# Patient Record
Sex: FEMALE | Race: WHITE | Employment: OTHER | URBAN - METROPOLITAN AREA
[De-identification: names, ages, dates, MRNs, and addresses within clinical notes are randomized per-mention and may not be internally consistent; named-entity substitution may affect disease eponyms.]

---

## 2017-07-27 ENCOUNTER — TRANSFERRED RECORDS (OUTPATIENT)
Dept: HEALTH INFORMATION MANAGEMENT | Facility: CLINIC | Age: 82
End: 2017-07-27

## 2019-01-01 ENCOUNTER — RECORDS - HEALTHEAST (OUTPATIENT)
Dept: LAB | Facility: CLINIC | Age: 84
End: 2019-01-01

## 2019-01-01 LAB
ALBUMIN UR-MCNC: ABNORMAL MG/DL
ALBUMIN UR-MCNC: ABNORMAL MG/DL
APPEARANCE UR: ABNORMAL
APPEARANCE UR: ABNORMAL
BACTERIA #/AREA URNS HPF: ABNORMAL HPF
BACTERIA #/AREA URNS HPF: ABNORMAL HPF
BACTERIA SPEC CULT: ABNORMAL
BACTERIA SPEC CULT: NO GROWTH
BILIRUB UR QL STRIP: NEGATIVE
BILIRUB UR QL STRIP: NEGATIVE
CAOX CRY #/AREA URNS HPF: PRESENT /[HPF]
CAOX CRY #/AREA URNS HPF: PRESENT /[HPF]
COLOR UR AUTO: ABNORMAL
COLOR UR AUTO: YELLOW
GLUCOSE UR STRIP-MCNC: NEGATIVE MG/DL
GLUCOSE UR STRIP-MCNC: NEGATIVE MG/DL
HGB UR QL STRIP: ABNORMAL
HGB UR QL STRIP: ABNORMAL
KETONES UR STRIP-MCNC: ABNORMAL MG/DL
KETONES UR STRIP-MCNC: ABNORMAL MG/DL
LEUKOCYTE ESTERASE UR QL STRIP: ABNORMAL
LEUKOCYTE ESTERASE UR QL STRIP: ABNORMAL
MUCOUS THREADS #/AREA URNS LPF: ABNORMAL LPF
MUCOUS THREADS #/AREA URNS LPF: ABNORMAL LPF
NITRATE UR QL: NEGATIVE
NITRATE UR QL: POSITIVE
PH UR STRIP: 5.5 [PH] (ref 4.5–8)
PH UR STRIP: 5.5 [PH] (ref 4.5–8)
RBC #/AREA URNS AUTO: ABNORMAL HPF
RBC #/AREA URNS AUTO: ABNORMAL HPF
RENAL EPI CELLS #/AREA URNS HPF: ABNORMAL LPF
SP GR UR STRIP: 1.02 (ref 1–1.03)
SP GR UR STRIP: 1.03 (ref 1–1.03)
SQUAMOUS #/AREA URNS AUTO: >100 LPF
SQUAMOUS #/AREA URNS AUTO: ABNORMAL LPF
UROBILINOGEN UR STRIP-ACNC: ABNORMAL
UROBILINOGEN UR STRIP-ACNC: ABNORMAL
WBC #/AREA URNS AUTO: >100 HPF
WBC #/AREA URNS AUTO: ABNORMAL HPF
WBC CLUMPS #/AREA URNS HPF: PRESENT /[HPF]
WBC CLUMPS #/AREA URNS HPF: PRESENT /[HPF]

## 2019-09-01 ENCOUNTER — APPOINTMENT (OUTPATIENT)
Dept: CT IMAGING | Facility: CLINIC | Age: 84
End: 2019-09-01
Attending: EMERGENCY MEDICINE
Payer: MEDICARE

## 2019-09-01 ENCOUNTER — APPOINTMENT (OUTPATIENT)
Dept: GENERAL RADIOLOGY | Facility: CLINIC | Age: 84
End: 2019-09-01
Attending: EMERGENCY MEDICINE
Payer: MEDICARE

## 2019-09-01 ENCOUNTER — HOSPITAL ENCOUNTER (OUTPATIENT)
Facility: CLINIC | Age: 84
Setting detail: OBSERVATION
Discharge: NURSING, REHAB OR LONG-TERM CARE | End: 2019-09-03
Attending: EMERGENCY MEDICINE | Admitting: HOSPITALIST
Payer: MEDICARE

## 2019-09-01 DIAGNOSIS — S09.93XA FACIAL INJURY, INITIAL ENCOUNTER: ICD-10-CM

## 2019-09-01 DIAGNOSIS — R29.6 FALLS FREQUENTLY: ICD-10-CM

## 2019-09-01 DIAGNOSIS — R09.02 HYPOXIA: ICD-10-CM

## 2019-09-01 DIAGNOSIS — R41.0 CONFUSION: ICD-10-CM
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
## 2019-09-01 LAB
ALBUMIN UR-MCNC: NEGATIVE MG/DL
ANION GAP SERPL CALCULATED.3IONS-SCNC: 3 MMOL/L (ref 3–14)
APPEARANCE UR: CLEAR
BASOPHILS # BLD AUTO: 0.1 10E9/L (ref 0–0.2)
BASOPHILS NFR BLD AUTO: 0.6 %
BILIRUB UR QL STRIP: NEGATIVE
BUN SERPL-MCNC: 16 MG/DL (ref 7–30)
CALCIUM SERPL-MCNC: 9.3 MG/DL (ref 8.5–10.1)
CHLORIDE SERPL-SCNC: 104 MMOL/L (ref 94–109)
CO2 SERPL-SCNC: 34 MMOL/L (ref 20–32)
COLOR UR AUTO: ABNORMAL
CREAT SERPL-MCNC: 0.81 MG/DL (ref 0.52–1.04)
D DIMER PPP FEU-MCNC: 0.9 UG/ML FEU (ref 0–0.5)
DIFFERENTIAL METHOD BLD: ABNORMAL
EOSINOPHIL # BLD AUTO: 0.3 10E9/L (ref 0–0.7)
EOSINOPHIL NFR BLD AUTO: 2.8 %
ERYTHROCYTE [DISTWIDTH] IN BLOOD BY AUTOMATED COUNT: 12.7 % (ref 10–15)
GFR SERPL CREATININE-BSD FRML MDRD: 66 ML/MIN/{1.73_M2}
GLUCOSE SERPL-MCNC: 79 MG/DL (ref 70–99)
GLUCOSE UR STRIP-MCNC: NEGATIVE MG/DL
HCT VFR BLD AUTO: 48.4 % (ref 35–47)
HGB BLD-MCNC: 15.5 G/DL (ref 11.7–15.7)
HGB UR QL STRIP: ABNORMAL
IMM GRANULOCYTES # BLD: 0 10E9/L (ref 0–0.4)
IMM GRANULOCYTES NFR BLD: 0.3 %
KETONES UR STRIP-MCNC: NEGATIVE MG/DL
LEUKOCYTE ESTERASE UR QL STRIP: NEGATIVE
LYMPHOCYTES # BLD AUTO: 1.6 10E9/L (ref 0.8–5.3)
LYMPHOCYTES NFR BLD AUTO: 17.7 %
MCH RBC QN AUTO: 32.6 PG (ref 26.5–33)
MCHC RBC AUTO-ENTMCNC: 32 G/DL (ref 31.5–36.5)
MCV RBC AUTO: 102 FL (ref 78–100)
MONOCYTES # BLD AUTO: 0.7 10E9/L (ref 0–1.3)
MONOCYTES NFR BLD AUTO: 7.5 %
MUCOUS THREADS #/AREA URNS LPF: PRESENT /LPF
NEUTROPHILS # BLD AUTO: 6.4 10E9/L (ref 1.6–8.3)
NEUTROPHILS NFR BLD AUTO: 71.1 %
NITRATE UR QL: NEGATIVE
NRBC # BLD AUTO: 0 10*3/UL
NRBC BLD AUTO-RTO: 0 /100
PH UR STRIP: 6.5 PH (ref 5–7)
PLATELET # BLD AUTO: 262 10E9/L (ref 150–450)
POTASSIUM SERPL-SCNC: 4.4 MMOL/L (ref 3.4–5.3)
RBC # BLD AUTO: 4.75 10E12/L (ref 3.8–5.2)
RBC #/AREA URNS AUTO: 9 /HPF (ref 0–2)
SODIUM SERPL-SCNC: 141 MMOL/L (ref 133–144)
SOURCE: ABNORMAL
SP GR UR STRIP: 1.01 (ref 1–1.03)
SQUAMOUS #/AREA URNS AUTO: 1 /HPF (ref 0–1)
TROPONIN I SERPL-MCNC: <0.015 UG/L (ref 0–0.04)
UROBILINOGEN UR STRIP-MCNC: 2 MG/DL (ref 0–2)
WBC # BLD AUTO: 9 10E9/L (ref 4–11)
WBC #/AREA URNS AUTO: 2 /HPF (ref 0–5)

## 2019-09-01 PROCEDURE — 93005 ELECTROCARDIOGRAM TRACING: CPT

## 2019-09-01 PROCEDURE — 85379 FIBRIN DEGRADATION QUANT: CPT | Performed by: EMERGENCY MEDICINE

## 2019-09-01 PROCEDURE — 70486 CT MAXILLOFACIAL W/O DYE: CPT

## 2019-09-01 PROCEDURE — 84484 ASSAY OF TROPONIN QUANT: CPT | Performed by: EMERGENCY MEDICINE

## 2019-09-01 PROCEDURE — 87086 URINE CULTURE/COLONY COUNT: CPT | Performed by: EMERGENCY MEDICINE

## 2019-09-01 PROCEDURE — 99220 ZZC INITIAL OBSERVATION CARE,LEVL III: CPT | Performed by: HOSPITALIST

## 2019-09-01 PROCEDURE — 85025 COMPLETE CBC W/AUTO DIFF WBC: CPT | Performed by: EMERGENCY MEDICINE

## 2019-09-01 PROCEDURE — 25000128 H RX IP 250 OP 636: Performed by: HOSPITALIST

## 2019-09-01 PROCEDURE — 99285 EMERGENCY DEPT VISIT HI MDM: CPT | Mod: 25

## 2019-09-01 PROCEDURE — 80048 BASIC METABOLIC PNL TOTAL CA: CPT | Performed by: EMERGENCY MEDICINE

## 2019-09-01 PROCEDURE — 81001 URINALYSIS AUTO W/SCOPE: CPT | Performed by: EMERGENCY MEDICINE

## 2019-09-01 PROCEDURE — 25000132 ZZH RX MED GY IP 250 OP 250 PS 637: Mod: GY | Performed by: EMERGENCY MEDICINE

## 2019-09-01 PROCEDURE — 72125 CT NECK SPINE W/O DYE: CPT

## 2019-09-01 PROCEDURE — G0378 HOSPITAL OBSERVATION PER HR: HCPCS

## 2019-09-01 PROCEDURE — 25800030 ZZH RX IP 258 OP 636: Performed by: HOSPITALIST

## 2019-09-01 PROCEDURE — 25000132 ZZH RX MED GY IP 250 OP 250 PS 637: Mod: GY | Performed by: PHYSICIAN ASSISTANT

## 2019-09-01 PROCEDURE — 93005 ELECTROCARDIOGRAM TRACING: CPT | Mod: 76

## 2019-09-01 PROCEDURE — 70450 CT HEAD/BRAIN W/O DYE: CPT

## 2019-09-01 PROCEDURE — 71046 X-RAY EXAM CHEST 2 VIEWS: CPT

## 2019-09-01 RX ORDER — DOCUSATE SODIUM 100 MG/1
100 CAPSULE, LIQUID FILLED ORAL DAILY
COMMUNITY

## 2019-09-01 RX ORDER — BUSPIRONE HYDROCHLORIDE 5 MG/1
5 TABLET ORAL 2 TIMES DAILY
COMMUNITY

## 2019-09-01 RX ORDER — ACETAMINOPHEN 325 MG/1
975 TABLET ORAL ONCE
Status: COMPLETED | OUTPATIENT
Start: 2019-09-01 | End: 2019-09-01

## 2019-09-01 RX ORDER — RIVASTIGMINE TARTRATE 1.5 MG/1
3 CAPSULE ORAL 2 TIMES DAILY
Status: DISCONTINUED | OUTPATIENT
Start: 2019-09-01 | End: 2019-09-03 | Stop reason: HOSPADM

## 2019-09-01 RX ORDER — ONDANSETRON 4 MG/1
4 TABLET, ORALLY DISINTEGRATING ORAL EVERY 6 HOURS PRN
Status: DISCONTINUED | OUTPATIENT
Start: 2019-09-01 | End: 2019-09-03 | Stop reason: HOSPADM

## 2019-09-01 RX ORDER — KETOCONAZOLE 20 MG/ML
SHAMPOO TOPICAL
COMMUNITY

## 2019-09-01 RX ORDER — CARBIDOPA AND LEVODOPA 25; 100 MG/1; MG/1
2 TABLET, EXTENDED RELEASE ORAL AT BEDTIME
COMMUNITY

## 2019-09-01 RX ORDER — CARBIDOPA AND LEVODOPA 25; 100 MG/1; MG/1
1.5 TABLET ORAL
COMMUNITY

## 2019-09-01 RX ORDER — ACETAMINOPHEN 650 MG/1
650 SUPPOSITORY RECTAL EVERY 4 HOURS PRN
Status: DISCONTINUED | OUTPATIENT
Start: 2019-09-01 | End: 2019-09-03 | Stop reason: HOSPADM

## 2019-09-01 RX ORDER — ASPIRIN 81 MG/1
81 TABLET ORAL DAILY
COMMUNITY

## 2019-09-01 RX ORDER — QUETIAPINE FUMARATE 25 MG/1
12.5 TABLET, FILM COATED ORAL AT BEDTIME
COMMUNITY

## 2019-09-01 RX ORDER — SODIUM CHLORIDE 9 MG/ML
INJECTION, SOLUTION INTRAVENOUS CONTINUOUS
Status: DISCONTINUED | OUTPATIENT
Start: 2019-09-01 | End: 2019-09-02

## 2019-09-01 RX ORDER — CARBIDOPA AND LEVODOPA 25; 100 MG/1; MG/1
2 TABLET, EXTENDED RELEASE ORAL AT BEDTIME
Status: DISCONTINUED | OUTPATIENT
Start: 2019-09-01 | End: 2019-09-03 | Stop reason: HOSPADM

## 2019-09-01 RX ORDER — BUPROPION HYDROCHLORIDE 300 MG/1
300 TABLET ORAL EVERY MORNING
COMMUNITY

## 2019-09-01 RX ORDER — FLUOCINOLONE ACETONIDE 0.11 MG/ML
OIL AURICULAR (OTIC) DAILY PRN
COMMUNITY

## 2019-09-01 RX ORDER — ONDANSETRON 2 MG/ML
4 INJECTION INTRAMUSCULAR; INTRAVENOUS EVERY 6 HOURS PRN
Status: DISCONTINUED | OUTPATIENT
Start: 2019-09-01 | End: 2019-09-03 | Stop reason: HOSPADM

## 2019-09-01 RX ORDER — CYANOCOBALAMIN (VITAMIN B-12) 500 MCG
1 TABLET ORAL DAILY
COMMUNITY

## 2019-09-01 RX ORDER — CARBIDOPA AND LEVODOPA 25; 100 MG/1; MG/1
1 TABLET, EXTENDED RELEASE ORAL 4 TIMES DAILY
Status: DISCONTINUED | OUTPATIENT
Start: 2019-09-01 | End: 2019-09-01

## 2019-09-01 RX ORDER — ESCITALOPRAM OXALATE 20 MG/1
20 TABLET ORAL EVERY EVENING
Status: DISCONTINUED | OUTPATIENT
Start: 2019-09-01 | End: 2019-09-03 | Stop reason: HOSPADM

## 2019-09-01 RX ORDER — IBUPROFEN 600 MG/1
600 TABLET, FILM COATED ORAL EVERY 6 HOURS PRN
COMMUNITY

## 2019-09-01 RX ORDER — ASPIRIN 81 MG/1
81 TABLET, CHEWABLE ORAL DAILY
Status: DISCONTINUED | OUTPATIENT
Start: 2019-09-01 | End: 2019-09-01

## 2019-09-01 RX ORDER — CARBIDOPA AND LEVODOPA 25; 100 MG/1; MG/1
TABLET, EXTENDED RELEASE ORAL
Status: ON HOLD | COMMUNITY
Start: 2010-08-09 | End: 2019-09-01

## 2019-09-01 RX ORDER — LEVOTHYROXINE SODIUM 75 UG/1
75 TABLET ORAL EVERY EVENING
COMMUNITY

## 2019-09-01 RX ORDER — SIMVASTATIN 5 MG
10 TABLET ORAL AT BEDTIME
Status: DISCONTINUED | OUTPATIENT
Start: 2019-09-01 | End: 2019-09-01

## 2019-09-01 RX ORDER — CITALOPRAM HYDROBROMIDE 20 MG/1
30 TABLET ORAL
Status: ON HOLD | COMMUNITY
End: 2019-09-01

## 2019-09-01 RX ORDER — ACETAMINOPHEN 500 MG
500-1000 TABLET ORAL EVERY 8 HOURS PRN
Status: DISCONTINUED | OUTPATIENT
Start: 2019-09-01 | End: 2019-09-03 | Stop reason: HOSPADM

## 2019-09-01 RX ORDER — FLUOCINOLONE ACETONIDE 0.11 MG/ML
OIL AURICULAR (OTIC) DAILY PRN
Status: ON HOLD | COMMUNITY
End: 2019-09-01

## 2019-09-01 RX ORDER — ACETAMINOPHEN 325 MG/1
650 TABLET ORAL EVERY 4 HOURS PRN
Status: DISCONTINUED | OUTPATIENT
Start: 2019-09-01 | End: 2019-09-01

## 2019-09-01 RX ORDER — LEVOTHYROXINE SODIUM 75 UG/1
75 TABLET ORAL EVERY EVENING
Status: DISCONTINUED | OUTPATIENT
Start: 2019-09-01 | End: 2019-09-01

## 2019-09-01 RX ORDER — RIVASTIGMINE TARTRATE 1.5 MG/1
3 CAPSULE ORAL 2 TIMES DAILY
COMMUNITY

## 2019-09-01 RX ORDER — SIMVASTATIN 10 MG
10 TABLET ORAL
Status: ON HOLD | COMMUNITY
End: 2019-09-01

## 2019-09-01 RX ORDER — BUSPIRONE HYDROCHLORIDE 5 MG/1
5 TABLET ORAL 2 TIMES DAILY
Status: DISCONTINUED | OUTPATIENT
Start: 2019-09-01 | End: 2019-09-03 | Stop reason: HOSPADM

## 2019-09-01 RX ORDER — ESCITALOPRAM OXALATE 20 MG/1
20 TABLET ORAL EVERY EVENING
COMMUNITY

## 2019-09-01 RX ORDER — DOCUSATE SODIUM 100 MG/1
100 CAPSULE, LIQUID FILLED ORAL DAILY
Status: DISCONTINUED | OUTPATIENT
Start: 2019-09-01 | End: 2019-09-03 | Stop reason: HOSPADM

## 2019-09-01 RX ORDER — ACETAMINOPHEN 500 MG
1000 TABLET ORAL 3 TIMES DAILY PRN
COMMUNITY

## 2019-09-01 RX ORDER — NALOXONE HYDROCHLORIDE 0.4 MG/ML
.1-.4 INJECTION, SOLUTION INTRAMUSCULAR; INTRAVENOUS; SUBCUTANEOUS
Status: DISCONTINUED | OUTPATIENT
Start: 2019-09-01 | End: 2019-09-03 | Stop reason: HOSPADM

## 2019-09-01 RX ORDER — BUPROPION HYDROCHLORIDE 150 MG/1
300 TABLET ORAL EVERY MORNING
Status: DISCONTINUED | OUTPATIENT
Start: 2019-09-01 | End: 2019-09-03 | Stop reason: HOSPADM

## 2019-09-01 RX ADMIN — DOCUSATE SODIUM 100 MG: 100 CAPSULE, LIQUID FILLED ORAL at 15:39

## 2019-09-01 RX ADMIN — BUPROPION HYDROCHLORIDE 300 MG: 150 TABLET, FILM COATED, EXTENDED RELEASE ORAL at 15:39

## 2019-09-01 RX ADMIN — BUSPIRONE HYDROCHLORIDE 5 MG: 5 TABLET ORAL at 15:39

## 2019-09-01 RX ADMIN — CARBIDOPA AND LEVODOPA 3 HALF-TAB: 25; 100 TABLET ORAL at 12:12

## 2019-09-01 RX ADMIN — ACETAMINOPHEN 975 MG: 325 TABLET ORAL at 08:23

## 2019-09-01 RX ADMIN — RIVASTIGMINE TARTRATE 3 MG: 1.5 CAPSULE ORAL at 20:20

## 2019-09-01 RX ADMIN — CARBIDOPA AND LEVODOPA 3 HALF-TAB: 25; 100 TABLET ORAL at 08:27

## 2019-09-01 RX ADMIN — SODIUM CHLORIDE: 9 INJECTION, SOLUTION INTRAVENOUS at 14:29

## 2019-09-01 RX ADMIN — CARBIDOPA AND LEVODOPA 3 HALF-TAB: 25; 100 TABLET ORAL at 15:39

## 2019-09-01 RX ADMIN — CARBIDOPA AND LEVODOPA 2 TABLET: 25; 100 TABLET, EXTENDED RELEASE ORAL at 22:13

## 2019-09-01 RX ADMIN — ESCITALOPRAM OXALATE 20 MG: 20 TABLET ORAL at 20:20

## 2019-09-01 RX ADMIN — CARBIDOPA AND LEVODOPA 3 HALF-TAB: 25; 100 TABLET ORAL at 20:20

## 2019-09-01 RX ADMIN — QUETIAPINE FUMARATE 12.5 MG: 25 TABLET, FILM COATED ORAL at 22:13

## 2019-09-01 NOTE — ED TRIAGE NOTES
Pt presents with EMS after an unwitnessed fall estimated around 0400,  thinks she may have hit her head on the edge of the sofa, hematoma and laceration to R forehead region.  says pt is acting normal, hypoxic in ED at 87% on room air. ABCs adequate after intervention, alert to self and place.

## 2019-09-01 NOTE — PROGRESS NOTES
PRIMARY DIAGNOSIS: Fall  OUTPATIENT/OBSERVATION GOALS TO BE MET BEFORE DISCHARGE:  1. ADLs back to baseline: assist of 2 pivot 2 wheelchair    2. Activity and level of assistance: assist of 2 pivot 2 wheelchair    3. Pain status: Pain free.    4. Return to near baseline physical activity: yes    Patient alert, confused. Lung sounds clear.  Bowel sounds active and audible. Denies pain.  Abrasion to right knee BRENDA.  Steri strips to laceration right forehead.  Bruising to right side of face. Redness blanchable to buttocks.  Regular diet.  /69 (BP Location: Left arm)   Pulse 65   Temp 98.3  F (36.8  C) (Oral)   Resp 16   Wt 52.6 kg (116 lb)   SpO2 96%  3L O2 via NC. PERRLA. Will continue to monitor.       Discharge Planner Nurse   Safe discharge environment identified: Yes  Barriers to discharge: Yes       Entered by: Felicia Guzman 09/01/2019 5:18 PM     Please review provider order for any additional goals.   Nurse to notify provider when observation goals have been met and patient is ready for discharge.

## 2019-09-01 NOTE — PROGRESS NOTES
ROOM # 220    Living Situation (if not independent, order SW consult):  Facility name: [facility]  :     Activity level at baseline: wheelchair  Activity level on admit: assist of 2 lift      Patient registered to observation; given Patient Bill of Rights; given the opportunity to ask questions about observation status and their plan of care.  Patient has been oriented to the observation room, bathroom and call light is in place.    Discussed discharge goals and expectations with patient/family.

## 2019-09-01 NOTE — PHARMACY-ADMISSION MEDICATION HISTORY
Admission medication history interview status for this patient is complete. See [facility] admission navigator for allergy information, prior to admission medications and immunization status.     Medication history interview source(s):Patient resides at [facility]  Medication history resources (including written lists, pill bottles, clinic record): MAR from facility  Primary pharmacy:n/a    Changes made to PTA medication list:  Added: rivastigmine, vitamin b, bupropion, buspirone, docusate, vitamin d, ketoconazole, quetiapine, fluocinolone, ibuprofen  Deleted: simvastatin  Changed: tylenol directions, added frequency to all meds, citalopram --> escitalopram, carbidopa/levodopa directions + formulations    Actions taken by pharmacist (provider contacted, etc):None     Additional medication history information:None    Medication reconciliation/reorder completed by provider prior to medication history? Yes    Do you take OTC medications (eg tylenol, ibuprofen, fish oil, eye/ear drops, etc)? Y(Y/N)    For patients on insulin therapy: N (Y/N)      Prior to Admission medications    Medication Sig Last Dose Taking? Auth Provider   acetaminophen (TYLENOL) 500 MG tablet Take 1,000 mg by mouth 3 times daily as needed  Past Month at Unknown time Yes Reported, Patient   aspirin 81 MG EC tablet Take 81 mg by mouth daily  8/31/2019 at 0730 Yes Reported, Patient   buPROPion (WELLBUTRIN XL) 300 MG 24 hr tablet Take 300 mg by mouth every morning 8/31/2019 at 1030 Yes Unknown, Entered By History   busPIRone (BUSPAR) 5 MG tablet Take 5 mg by mouth 2 times daily 1030am + 430pm 8/31/2019 at 1630 Yes Unknown, Entered By History   carbidopa-levodopa (SINEMET CR)  MG CR tablet Take 2 tablets by mouth At Bedtime 8/31/2019 at pm Yes Unknown, Entered By History   carbidopa-levodopa (SINEMET)  MG tablet Take 1.5 tablets by mouth 5 times daily 730am - 1030am - 130pm - 430pm - 730pm  8/31/2019 at pm Yes Unknown, Entered By  History   cholecalciferol 1000 units TABS Take 1 tablet by mouth daily 8/31/2019 at 1330 Yes Unknown, Entered By History   docusate sodium (COLACE) 100 MG capsule Take 100 mg by mouth daily 8/31/2019 at 1030 Yes Unknown, Entered By History   escitalopram (LEXAPRO) 20 MG tablet Take 20 mg by mouth every evening 8/31/2019 at 2230 Yes Unknown, Entered By History   fluocinolone acetonide 0.01 % OIL Apply topically daily as needed To scalp twice weekly as needed Past Month at Unknown time Yes Unknown, Entered By History   folic acid 0.8 MG CAPS Take 1 capsule by mouth daily  8/31/2019 at 1030 Yes Reported, Patient   ibuprofen (ADVIL/MOTRIN) 600 MG tablet Take 600 mg by mouth every 6 hours as needed for moderate pain Past Week at Unknown time Yes Unknown, Entered By History   ketoconazole (NIZORAL) 2 % external shampoo Apply topically twice a week Tuesday + Friday evenings Past Week at Unknown time Yes Unknown, Entered By History   levothyroxine (SYNTHROID/LEVOTHROID) 75 MCG tablet Take 75 mcg by mouth every evening  8/31/2019 at 2230 Yes Reported, Patient   omeprazole (PRILOSEC) 20 MG DR capsule Take 20 mg by mouth daily  8/31/2019 at am Yes Reported, Patient   QUEtiapine (SEROQUEL) 25 MG tablet Take 12.5 mg by mouth At Bedtime 8/31/2019 at pm Yes Unknown, Entered By History   rivastigmine (EXELON) 1.5 MG capsule Take 3 mg by mouth 2 times daily 8/31/2019 at 1930 Yes Unknown, Entered By History   vitamin B complex with vitamin C (VITAMIN  B COMPLEX) tablet Take 1 tablet by mouth daily 8/31/2019 at am Yes Unknown, Entered By History

## 2019-09-01 NOTE — ED NOTES
[facility]  ED Nurse Handoff Report    Bita Veronica is a 85 year old female   ED Chief complaint: Fall  . ED Diagnosis:   Final diagnoses:   Confusion   Falls frequently   Facial injury, initial encounter   Hypoxia     Allergies:   Allergies   Allergen Reactions     Shellfish Allergy Anaphylaxis     Iodine      Shellfish-Derived Products        Code Status: DNR / DNI  Activity level - Baseline/Home:  Assist X 2. Activity Level - Current:   Assist X 2. Lift room needed: No. Bariatric: No   Needed: No   Isolation: No. Infection: Not Applicable.     Vital Signs:   Vitals:    09/01/19 0900 09/01/19 0915 09/01/19 0930 09/01/19 1045   BP: (!) 143/67 132/83 132/60    Pulse: 61 63 60    Resp:       Temp:       SpO2: (!) 89% (!) 89% 90% 90%   Weight:           Cardiac Rhythm:  ,      Pain level: 0-10 Pain Scale: 6  Patient confused: No. Patient Falls Risk: Yes. Can not ambulated alone, falls.  Uses walker one time daily with aid otherwise in wheelchair  Elimination Status: uses depends, was incontinent of urine.   Patient Report - Initial Complaint:Pt presents with EMS after an unwitnessed fall estimated around 0400,  thinks she may have hit her head on the edge of the sofa, hematoma and laceration to R forehead region.  says pt is acting normal, hypoxic in ED at 87% on room air. ABCs adequate after intervention, alert to self and place  Focused Assessment: small laceration to the right forehead, hematoma to face/scalp.  Right shoulder pain  Tests Performed: CT, xray, labs. Abnormal Results:   Labs Ordered and Resulted from Time of ED Arrival Up to the Time of Departure from the ED   CBC WITH PLATELETS DIFFERENTIAL - Abnormal; Notable for the following components:       Result Value    Hematocrit 48.4 (*)      (*)     All other components within normal limits   BASIC METABOLIC PANEL - Abnormal; Notable for the following components:    Carbon Dioxide 34 (*)     All other  components within normal limits   ROUTINE UA WITH MICROSCOPIC - Abnormal; Notable for the following components:    Blood Urine Trace (*)     RBC Urine 9 (*)     Mucous Urine Present (*)     All other components within normal limits   TROPONIN I   URINE CULTURE AEROBIC BACTERIAL     Low oxygen sats  .   Treatments provided: oxygen, lac repair, meds  Family Comments:  at bedside.  Daughter updated via telephone  OBS brochure/video discussed/provided to patient:  Yes    ED Medications:   Medications   lidocaine/EPINEPHrine/tetracaine (LET) solution KIT (has no administration in time range)   carbidopa-levodopa half-tab 12.5-50 mg (3 half-tab Oral Given 9/1/19 0827)   acetaminophen (TYLENOL) tablet 975 mg (975 mg Oral Given 9/1/19 0823)     Drips infusing:  No  For the majority of the shift, the patient's behavior Green. Interventions performed were none     Severe Sepsis OR Septic Shock Diagnosis Present: No      ED Nurse Name/Phone Number: Felicia Rodriguez RN,   10:59 AM    RECEIVING UNIT ED HANDOFF REVIEW    Above ED Nurse Handoff Report was reviewed: Yes  Reviewed by: Cassie Shafer, RN on September 1, 2019 at 12:16 PM

## 2019-09-01 NOTE — ED PROVIDER NOTES
History     Chief Complaint:    Fall      HPI   Bita Veronica is a 85 year old female, with a past medical history significant for Parkinson's disease and dementia, who presents to the ED via EMS for evaluation following a fall. The patient's  states that he woke up this morning around 0400 and heard his wife whimpering from the other room. He went into the living room and found her sprawled out on the floor; he suspects that she hit her head on either their sofa or a chair. She sustained a laceration and hematoma to her right forehead. The  subsequently called EMS. EMS placed a C-collar. The  states that she seems to be acting baseline. History limited secondary to dementia.     Allergies:  Iodine    Medications:    Sinemet  Prilosec  Aspirin 81 mg   Celexa  Synthroid  Zocor    Past Medical History:    Parkinson's disease  Cataracts, bilateral  HLD  Presbyopia  Blepharitis  Mild cognitive impairment  Thyroid disorder  Ureteral obstruction  Breast cancer  Depression  HTN  Glaucoma  Keratoconus    Past Surgical History:    Dual chamber pacemaker, dual  Mastectomy  Radical with pec muscle, ax and mamm node  ADRIAN   Oophorectomy   Appendectomy  Toe surgery  Ligate fallopian tube abd/vag  Stab phlebectomy vv  Cataract removal left    Family History:    Mother: CAD  Daughter: depression  Sister: cataract, CAD, thyroid disorder    Social History:  Negative for tobacco use.  Negative for alcohol use.  Lives in assisted living facility.   Marital Status:        Review of Systems   Unable to perform ROS: Dementia       Physical Exam   First Vitals:  BP: (!) 182/90  Pulse: 65  Heart Rate: 65  Temp: 97.8  F (36.6  C)  Resp: 16  Weight: 52.6 kg (116 lb)  SpO2: 100 %      Physical Exam  General: Resting on the bed.  Head: No obvious trauma to head.  Ears, Nose, Throat:  External ears normal.  Nose normal.  No pharyngeal erythema, swelling or exudate.  Midline uvula.    Eyes:  Conjunctivae clear.   Pupils are equal, round, and reactive. EOMI.  No hyphema or hypopyon.  Right periorbital swelling and ecchymosis.  Tonometry L 12, R 15  Neck: Normal range of motion.  Neck supple.   CV: Regular rate and rhythm.  No murmurs.      Respiratory: Effort normal and breath sounds normal.  No wheezing or crackles.   Gastrointestinal: Soft.  No distension. There is no tenderness.    Musculoskeletal: Normal range of motion.  Non tender extremities to palpations.  Nontender cervical, thoracic, lumbar spine to palpation without midline step-off or deformity.  Neuro: Alert. Moving all extremities appropriately.  Normal speech.  Answers questions somewhat appropriately.  Motor intact throughout.  Strength intact throughout.  Skin: Skin is warm and dry.  No rash noted.     Emergency Department Course   ECG:  Indication: CV screen  Time: 0600  Vent. Rate 60 bpm. ME interval 202. QRS duration 96. QT/QTc 450/450. P-R-T axis * -16 59.  Atrial-paced rhythm. Abnormal ECG. No significant change compared to EKG dated 7/27/17. Read time: 0605    Indication: repeat  Time: 0625  Vent. Rate 61 bpm. ME interval 194. QRS duration 96. QT/QTc 452/455. P-R-T axis * -18 48.  Atrial-paced rhythm. Abnormal ECG. No significant change compared to EKG dated 7/27/17. Read time: 0628    Procedures:     Laceration Repair        LACERATION:  A simple clean 2 cm laceration.      LOCATION:  Right forehead      PREPARATION:  Irrigation with Normal Saline and Shur Clens      DEBRIDEMENT:  no debridement      CLOSURE:  Wound was closed with steri strips.     Imaging:  Radiographic findings were communicated with the patient and  who voiced understanding of the findings.  CT Head without contrast:   IMPRESSION:     1. No evidence of acute intracranial hemorrhage, mass, or herniation.  2. There is generalized atrophy of the brain. White matter changes are  present in the cerebral hemispheres that are consistent with small  vessel ischemic disease in this  age patient as per radiology.    CT Facial Bones without contrast:   IMPRESSION: Right frontal scalp soft tissue swelling and hematoma. No  evidence of facial bone fracture as per radiology.    CT Cervical spine w/o contrast   IMPRESSION:   1. No evidence of acute fracture or subluxation in the cervical spine.  2. Degenerative changes in the cervical spine as described above as per radiology.    XR Chest 2 views:   IMPRESSION:  1. No acute airspace disease.  2. Mild enlargement of the cardiac silhouette as per radiology.    Laboratory:  CBC: WBC: 9.0, HGB: 15.5, PLT: 262  BMP: CO2: 34 (H), o/w WNL (Creatinine: 0.81)    0555 Troponin: <0.015  UA with Microscopic: blood: trace, RBC: 9, Mucous: present, o/w WNL  Urine culture: pending  D dimer: 0.9 (H)    Interventions:  0823 Tylenol 975 mg PO  0927 Sinemet 12.5-50 mg, 3 half-tab PO    Emergency Department Course:  Nursing notes and vitals reviewed. (0613) I performed an exam of the patient as documented above.     IV inserted. Medicine administered as documented above. Blood drawn. This was sent to the lab for further testing, results above.     The patient was sent for a chest x-ray, head CT, facial bones CT, and c-spine CT while in the emergency department, findings above.     0745 I rechecked the patient and discussed the results of her workup thus far. Patient's  initially declined additional workup but he is now requesting further workup.     0800 I performed a laceration repair as noted above.     1048 I rechecked the patient and discussed the results of her workup thus far.     1120  I consulted with Dr. Boyd of the hospitalist services. They are in agreement to accept the patient for admission.    Findings and plan explained to the Patient and spouse who consents to admission. Discussed the patient with Dr. Boyd, who will admit the patient to a obs bed for further monitoring, evaluation, and treatment.    Impression & Plan    Medical Decision  Makin-year-old female presents with fall.  Patient does have known Parkinson's disease and has frequent falls.  Vital signs are reassuring except for occasional hypoxia when patient is laying flat.  Recovered quickly with supplemental oxygen.  Broad differential was pursued including not limited to cervical fracture dislocation, hemorrhage, facial fracture, orbital injury, electrolyte metabolic or renal dysfunction, anemia, coronary syndrome, PE, pneumonia, pneumothorax, effusion, etc.  Overall patient at baseline per .  Unclear as to the mechanism of the fall but patient does have Parkinson's and has known recurrent falls.  She often forgets to use her walking assistance.  CT head shows no evidence acute intracranial hemorrhage, mass, herniation.  Patient CT shows scalp hematoma but no evidence of facial bone fracture.  CT cervical shows no acute fracture or subluxation.  Orbit appears intact.  There is no appreciable trauma to the eye itself.  No high plan or hyphema.  Intraocular pressures are normal.  She reports no visual acuity change.  Extraocular movements intact no evidence of entrapment.  CBC shows no leukocytosis or anemia.  BMP shows no acute electrolyte metabolic renal dysfunction.  EKG is paced, no acute ST-T wave changes.  No evidence of ischemia.  No chest pain or shortness breath.  Troponin is negative.  Chest x-ray shows no evidence acute pneumonia, effusion.  Had lengthy discussion with patient's .  He is very fixated on patient's vital signs and does not feel comfortable patient going home.  Her hypoxia is largely positional therefore I cannot rule out sleep apnea but  does not feel to look for any patient home.  Inpatient provider requested d-dimer which is elevated.  Further testing was deferred to inpatient provider, whom agreed to follow up results.  Initially ordered CT PE but severe anaphylaxis to iodine and patient transferred to the floor prior to ability to  clarify allergy to see if would tolerate CT contrast.  Patient was transferred to the floor.  Admitted to obs.      Diagnosis:    ICD-10-CM    1. Confusion R41.0    2. Falls frequently R29.6    3. Facial injury, initial encounter S09.93XA    4. Hypoxia R09.02        Disposition:  Admitted to Dr. Oliver Shaw Disclosure:  I,  Kenny Gilbert, am serving as a scribe on 9/1/2019 at 6:13 AM to personally document services performed by Evi Gilmore MD based on my observations and the provider's statements to me.        Kenny Gilbert  9/1/2019   [facility] EMERGENCY DEPARTMENT       Evi Gilmore MD  09/01/19 [id number]

## 2019-09-01 NOTE — H&P
[facility]    History and Physical - Hospitalist Service       Date of Admission:  9/1/2019    Assessment & Plan   Bita Veronica is a 85 year old female with advanced dementia, Parkinson's, essentially wheelchair bound, frequent falls (forgets she is unable to ambulate), sss with pacemaker, HLP, hypothyroidism, depression, syncope admitted on 9/1/2019 with a fall. Was hypoxic in the ED.    Hypoxia     - unclear cause    - patient can give no meaningful history    - d-dimer was slightly elevated, though likely normal for her age    - CT PE study already ordered from ED    - will try to titrate O2 off    - CXR normal from ED    Falls    - patient falls frequently    - she got out of bed without her 's knowledge and forgot she cannot walk    - has facial trauma    - CT imaging in ED was negative for fractures    - no further work-up    Advanced dementia with Parkinsons    - no need for PT as she is essentially wheelchair bound     - family have many questions about her memory care/assisted living and why she cannot have a bed rail up    - social work consult to look at the services the family has (Q2hr checks) and what our options are for more 24 hour supervision (patient gets up at night while  is wearing CPAP and cannot hear her)    - cont home sinemet: pharmacy still reconciling meds    Hypothyroidism    - cont synthroid    HLP    - cont statin    Depression    - on celexa    Met with  and daughter for 45 mins. Answered all questions    Full code       Diet: Regular Diet Adult    DVT Prophylaxis: Enoxaparin (Lovenox) SQ  Ross Catheter: not present  Code Status: Full Code      Disposition Plan   Expected discharge: Tomorrow, recommended to prior living arrangement once safe disposition plan/ TCU bed available.  Entered: Carlos Boyd MD 09/01/2019, 1:11 PM     The patient's care was discussed with the Bedside Nurse, Patient, Patient's Family and ED provider.    Carlos Caldera  MD Oliver  [facility]    ______________________________________________________________________    Chief Complaint   Fall with facial trauma    History is obtained from the patient's , daughter, EMS notes    History of Present Illness   Bita Veronica is a 85 year old female with advanced dementia, Parkinson's, essentially wheelchair bound, frequent falls (forgets she is unable to ambulate), sss with pacemaker, HLP, hypothyroidism, depression, syncope admitted on 9/1/2019 with a fall. Was hypoxic in the ED. Patient cannot give me a history. Sh does tell me she fell and her  brought her here. She does currently deny any pain.  states patient sometimes gets up in the night when he is wearing his CPAP and cannot hear her. He states today at about 4am he thought he heard moaning so he got up and noticed his wife was not in bed. He found her in the living room on the floor. She must have hit a chair because he noticed she had blood on her face. She was responding appropriately. He pressed their alarm. An aide came who tried calling a nurse, but none answered ( and daughter are fixated on this). EMS was called eventually. She was noted to be hypoxic with EMS with sats in the 80s. Was recently treated for UTI, but no other recent complaints of chest pain, sob, abdo pain, n/v/d, fever or chills, URI symptoms.    Review of Systems    The 10 point Review of Systems is negative other than noted in the HPI or here.     Past Medical History    I have reviewed this patient's medical history and updated it with pertinent information if needed.   History reviewed. No pertinent past medical history.    Past Surgical History   I have reviewed this patient's surgical history and updated it with pertinent information if needed.  No past surgical history on file.    Social History   I have reviewed this patient's social history and updated it with pertinent information if needed.  Social  History     Tobacco Use     Smoking status: None   Substance Use Topics     Alcohol use: None     Drug use: None       Family History   I have reviewed this patient's family history and updated it with pertinent information if needed.   Reviewed, but non-contributory    Prior to Admission Medications   Prior to Admission Medications   Prescriptions Last Dose Informant Patient Reported? Taking?   acetaminophen (TYLENOL) 500 MG tablet   Yes No   Sig: Take 500-1,000 mg by mouth   aspirin 81 MG EC tablet   Yes No   Sig: Take 81 mg by mouth   carbidopa-levodopa (SINEMET CR)  MG CR tablet   Yes Yes   citalopram (CELEXA) 20 MG tablet   Yes No   Sig: Take 30 mg by mouth   folic acid 0.8 MG CAPS   Yes No   Sig: Take 1 capsule by mouth   levothyroxine (SYNTHROID/LEVOTHROID) 75 MCG tablet   Yes No   Sig: Take 75 mcg by mouth   omeprazole (PRILOSEC) 20 MG DR capsule   Yes Yes   simvastatin (ZOCOR) 10 MG tablet   Yes No   Sig: Take 10 mg by mouth      Facility-Administered Medications: None     Allergies   Allergies   Allergen Reactions     Shellfish Allergy Anaphylaxis     Iodine      Shellfish-Derived Products        Physical Exam   Vital Signs: Temp: 98.3  F (36.8  C) Temp src: Oral BP: 122/69 Pulse: 65 Heart Rate: 65 Resp: 16 SpO2: 96 % O2 Device: Nasal cannula Oxygen Delivery: 3 LPM  Weight: 116 lbs 0 oz    Constitutional: awake, attempts to respond appropriately  Eyes: Lids and lashes normal, pupils equal, round and reactive to light, extra ocular muscles intact, sclera clear, conjunctiva normal. Right sided facial abrasions noted  ENT: Normocephalic, without obvious abnormality, atraumatic, sinuses nontender on palpation, external ears without lesions, oral pharynx with moist mucous membranes, tonsils without erythema or exudates, gums normal and good dentition.  Respiratory: No increased work of breathing, good air exchange, clear to auscultation bilaterally, no crackles or wheezing  Cardiovascular: Normal apical  impulse, regular rate and rhythm, normal S1 and S2, no S3 or S4, and no murmur noted  GI: No scars, normal bowel sounds, soft, non-distended, non-tender, no masses palpated, no hepatosplenomegally  Skin: normal skin color, texture, turgor  Musculoskeletal: no lower extremity pitting edema present    Data   Data reviewed today: I reviewed all medications, new labs and imaging results over the last 24 hours. I personally reviewed the EKG tracing showing aced, no st t changes and the CT image(s) showing no acute fractures.      Most Recent 3 CBC's:  Recent Labs   Lab Test 09/01/19  0555   WBC 9.0   HGB 15.5   *        Most Recent 3 BMP's:  Recent Labs   Lab Test 09/01/19  0555      POTASSIUM 4.4   CHLORIDE 104   CO2 34*   BUN 16   CR 0.81   ANIONGAP 3   DENYS 9.3   GLC 79     Most Recent 2 LFT's:No lab results found.  Most Recent 3 INR's:No lab results found.  Most Recent Urinalysis:  Recent Labs   Lab Test 09/01/19  0858   COLOR Light Yellow   APPEARANCE Clear   URINEGLC Negative   URINEBILI Negative   URINEKETONE Negative   SG 1.011   UBLD Trace*   URINEPH 6.5   PROTEIN Negative   NITRITE Negative   LEUKEST Negative   RBCU 9*   WBCU 2

## 2019-09-02 LAB
ANION GAP SERPL CALCULATED.3IONS-SCNC: 4 MMOL/L (ref 3–14)
BACTERIA SPEC CULT: NO GROWTH
BUN SERPL-MCNC: 12 MG/DL (ref 7–30)
CALCIUM SERPL-MCNC: 8.8 MG/DL (ref 8.5–10.1)
CHLORIDE SERPL-SCNC: 106 MMOL/L (ref 94–109)
CO2 SERPL-SCNC: 31 MMOL/L (ref 20–32)
CREAT SERPL-MCNC: 0.63 MG/DL (ref 0.52–1.04)
GFR SERPL CREATININE-BSD FRML MDRD: 82 ML/MIN/{1.73_M2}
GLUCOSE SERPL-MCNC: 76 MG/DL (ref 70–99)
Lab: NORMAL
POTASSIUM SERPL-SCNC: 3.9 MMOL/L (ref 3.4–5.3)
SODIUM SERPL-SCNC: 141 MMOL/L (ref 133–144)
SPECIMEN SOURCE: NORMAL

## 2019-09-02 PROCEDURE — 25000132 ZZH RX MED GY IP 250 OP 250 PS 637: Mod: GY | Performed by: PHYSICIAN ASSISTANT

## 2019-09-02 PROCEDURE — 36415 COLL VENOUS BLD VENIPUNCTURE: CPT | Performed by: HOSPITALIST

## 2019-09-02 PROCEDURE — 96372 THER/PROPH/DIAG INJ SC/IM: CPT

## 2019-09-02 PROCEDURE — 25000128 H RX IP 250 OP 636: Performed by: HOSPITALIST

## 2019-09-02 PROCEDURE — 99207 ZZC CDG-MDM COMPONENT: MEETS LOW - DOWN CODED: CPT | Performed by: HOSPITALIST

## 2019-09-02 PROCEDURE — G0378 HOSPITAL OBSERVATION PER HR: HCPCS

## 2019-09-02 PROCEDURE — 80048 BASIC METABOLIC PNL TOTAL CA: CPT | Performed by: HOSPITALIST

## 2019-09-02 PROCEDURE — 99225 ZZC SUBSEQUENT OBSERVATION CARE,LEVEL II: CPT | Performed by: HOSPITALIST

## 2019-09-02 RX ADMIN — CARBIDOPA AND LEVODOPA 3 HALF-TAB: 25; 100 TABLET ORAL at 20:39

## 2019-09-02 RX ADMIN — CARBIDOPA AND LEVODOPA 3 HALF-TAB: 25; 100 TABLET ORAL at 16:28

## 2019-09-02 RX ADMIN — CARBIDOPA AND LEVODOPA 3 HALF-TAB: 25; 100 TABLET ORAL at 11:01

## 2019-09-02 RX ADMIN — CARBIDOPA AND LEVODOPA 3 HALF-TAB: 25; 100 TABLET ORAL at 13:24

## 2019-09-02 RX ADMIN — ESCITALOPRAM OXALATE 20 MG: 20 TABLET ORAL at 20:39

## 2019-09-02 RX ADMIN — DOCUSATE SODIUM 100 MG: 100 CAPSULE, LIQUID FILLED ORAL at 08:57

## 2019-09-02 RX ADMIN — QUETIAPINE FUMARATE 12.5 MG: 25 TABLET, FILM COATED ORAL at 22:15

## 2019-09-02 RX ADMIN — ENOXAPARIN SODIUM 30 MG: 30 INJECTION SUBCUTANEOUS at 16:28

## 2019-09-02 RX ADMIN — BUSPIRONE HYDROCHLORIDE 5 MG: 5 TABLET ORAL at 16:28

## 2019-09-02 RX ADMIN — CARBIDOPA AND LEVODOPA 2 TABLET: 25; 100 TABLET, EXTENDED RELEASE ORAL at 22:15

## 2019-09-02 RX ADMIN — BUSPIRONE HYDROCHLORIDE 5 MG: 5 TABLET ORAL at 11:01

## 2019-09-02 RX ADMIN — RIVASTIGMINE TARTRATE 3 MG: 1.5 CAPSULE ORAL at 08:57

## 2019-09-02 RX ADMIN — CARBIDOPA AND LEVODOPA 3 HALF-TAB: 25; 100 TABLET ORAL at 08:57

## 2019-09-02 RX ADMIN — RIVASTIGMINE TARTRATE 3 MG: 1.5 CAPSULE ORAL at 20:39

## 2019-09-02 RX ADMIN — BUPROPION HYDROCHLORIDE 300 MG: 150 TABLET, FILM COATED, EXTENDED RELEASE ORAL at 08:57

## 2019-09-02 NOTE — PLAN OF CARE
Patient Improving    PRIMARY DIAGNOSIS: GENERALIZED WEAKNESS    OUTPATIENT/OBSERVATION GOALS TO BE MET BEFORE DISCHARGE  1. Orthostatic performed: No    2. Tolerating PO medications: Yes, with applesauce    3. Return to near baseline physical activity: Yes, wheelchair bound at baseline    4. Cleared for discharge by consultants (if involved): No    Vitals are Temp: 97.9  F (36.6  C) Temp src: Axillary BP: 122/66 Pulse: 80 Heart Rate: 60 Resp: 16 SpO2: 95 %.  Patient is Alert to and Self. They are 2 assist with Gait Belt, Walker and Wheelchair.  Pt is a Regular diet.  They are denying pain.  Patient has Normal Saline 0.9% running at 100 mL per hour.  Patient has steri-strips on head, they are CDI.  Patient has a purewick in for incontinence.  Pt is on 3L of O2.  Plan of care is a SW consult.      Discharge Planner Nurse   Safe discharge environment identified: No  Barriers to discharge: Yes            Please review provider order for any additional goals.   Nurse to notify provider when observation goals have been met and patient is ready for discharge.

## 2019-09-02 NOTE — PLAN OF CARE
Patient Improving    PRIMARY DIAGNOSIS: GENERALIZED WEAKNESS    OUTPATIENT/OBSERVATION GOALS TO BE MET BEFORE DISCHARGE  1. Orthostatic performed: No    2. Tolerating PO medications: Yes, with applesauce    3. Return to near baseline physical activity: Yes, wheelchair bound at baseline    4. Cleared for discharge by consultants (if involved): No    Vitals are Temp: 97.6  F (36.4  C) Temp src: Axillary BP: (!) 152/66 Pulse: 80 Heart Rate: 62 Resp: 14 SpO2: 95 %.  Patient is Alert to and Self. They are 2 assist with Gait Belt, Walker and Wheelchair.  Pt is a Regular diet.  They are denying pain.  Patient is saline locked.  Patient has steri-strips on head, they are CDI.  Patient has a purewick in for incontinence.  Pt is on 3L of O2.  Plan of care is a SW consult.      Discharge Planner Nurse   Safe discharge environment identified: No  Barriers to discharge: Yes            Please review provider order for any additional goals.   Nurse to notify provider when observation goals have been met and patient is ready for discharge.

## 2019-09-02 NOTE — PLAN OF CARE
Patient Improving    PRIMARY DIAGNOSIS: GENERALIZED WEAKNESS    OUTPATIENT/OBSERVATION GOALS TO BE MET BEFORE DISCHARGE  1. Orthostatic performed: No    2. Tolerating PO medications: Yes, with applesauce    3. Return to near baseline physical activity: Yes, wheelchair bound at baseline    4. Cleared for discharge by consultants (if involved): No    Vitals are Temp: 97.9  F (36.6  C) Temp src: Axillary BP: 122/66 Pulse: 80 Heart Rate: 60 Resp: 16 SpO2: 95 %.  Patient is Alert to and Self. They are 2 assist with Gait Belt, Walker and Wheelchair.  Pt is a Regular diet.  They are denying pain.  Patient is saline locked.  Patient has steri-strips on head, they are CDI.  Patient has a purewick in for incontinence.  Pt is on 3L of O2.  Plan of care is a SW consult.      Discharge Planner Nurse   Safe discharge environment identified: No  Barriers to discharge: Yes            Please review provider order for any additional goals.   Nurse to notify provider when observation goals have been met and patient is ready for discharge.

## 2019-09-02 NOTE — PLAN OF CARE
PRIMARY DIAGNOSIS: GENERALIZED WEAKNESS     OUTPATIENT/OBSERVATION GOALS TO BE MET BEFORE DISCHARGE  1. Orthostatic performed: No     2. Tolerating PO medications: Yes, with applesauce     3. Return to near baseline physical activity: Yes, wheelchair bound at baseline     4. Cleared for discharge by consultants (if involved): N/A     Patient is alert to self. She is 2 assist with stand/pivot with gait belt, walker and wheelchair.  Pt is denying pain. She has no IV access.  Patient has steri-strips on her right forehead, CDI.  Patient is still up in the chair.  Pt is still on 2L of O2.  Unable to wean. [facility] did not have staff today to take the patient back. Plan is for 30 minute checks on the patient, increased from Q2 hours.        Discharge Planner Nurse   Safe discharge environment identified: No  Barriers to discharge: Yes

## 2019-09-02 NOTE — CONSULTS
Care Transition Initial Assessment - AGUSTO     Met with: Pt Bita, spouse Sarmad and briefly with daughter Alondra.  Active Problems:    Fall       DATA/INTERVENTION  Lives With: spouse at [facility] [phone]. They moved there 6 weeks ago. Both Bita and Sarmad reside in the Memory Care Unit.  Sarmad is main caregiver of Bita and is not interested in increasing cares or safety checks. When they arrived at the [facility] He brought a bed with a side rail and bed alarm. He reports that the facility removed both as they are not allowed.  Quality of Family Relationships: involved  Description of Support System: Supportive  Who is your support system?:   Identified issues/concerns regarding health management:    Quality of Family Relationships: involved.  Sarmad is at bedside and supportive. Sarmad has said he does not want daughter Alondra involved. The couple's daughter arrived for a visit, their discussion quickly escalated to interrupting and yelling at each other. AGUSTO had to ask Alondra to leave.  AGUSTO spoke with aide at [facility] then spoke with on call RN Bita [phone]. She reports she is on call via phone only today and is unable to come in for return to [facility]. Clinical  is Adal [phone] who reports they can discuss increasing cares with family tomorrow as no-one is available to come in on a holiday to arrange this.    ASSESSMENT  Cognitive Status:  awake and confused, unable to join conversation. Spouse is caregiver and decision maker.  Concerns to be addressed: Safety in the home which the [facility] is aware of.     PLAN  Financial costs for the patient includes. Spouse unwilling to pay for 1/2 hour checks. He reports that at this time they don't do the minimum 2 hour checks. SW discussed plan for him to have caregivers sign in at their door every time they do a check so he has record of what he is paying for.  Patient given options and choices for discharge. Spouse did not  want other options for discharge other than returning to [facility].   He believes this is the safest option for her. Fall history this year is 5/15/19 and 9/1/19.  Patient/family is agreeable to the plan?  Yes  Patient anticipates discharging to:  [facility], awaiting assessment of pt's need for home O2.

## 2019-09-02 NOTE — PLAN OF CARE
PRIMARY DIAGNOSIS: GENERALIZED WEAKNESS     OUTPATIENT/OBSERVATION GOALS TO BE MET BEFORE DISCHARGE  1. Orthostatic performed: No     2. Tolerating PO medications: Yes, with applesauce     3. Return to near baseline physical activity: Yes, wheelchair bound at baseline     4. Cleared for discharge by consultants (if involved): No     Patient is alert to self. They are 2 assist with stand/pivot with gait belt, walker and wheelchair.  Pt is denying pain. She has no IV access.  Patient has steri-strips on her right forehead, they are CDI.  Patient has a purewick for incontinence.  Pt is on 2L of O2.  Plan of care is a SW consult.        Discharge Planner Nurse   Safe discharge environment identified: No  Barriers to discharge: Yes

## 2019-09-02 NOTE — PLAN OF CARE
PRIMARY DIAGNOSIS: GENERALIZED WEAKNESS     OUTPATIENT/OBSERVATION GOALS TO BE MET BEFORE DISCHARGE  1. Orthostatic performed: No     2. Tolerating PO medications: Yes, with applesauce     3. Return to near baseline physical activity: Yes, wheelchair bound at baseline     4. Cleared for discharge by consultants (if involved): N/A     Patient is alert to self. She is 2 assist with stand/pivot with gait belt, walker and wheelchair.  Pt is denying pain. She has no IV access.  Patient has steri-strips on her right forehead, CDI.  Patient is now up in the chair. She had a large breakfast.  Pt is still on 2L of O2.  We tried to wean, but she desatted to the mid 80s when she fell asleep.        Discharge Planner Nurse   Safe discharge environment identified: No  Barriers to discharge: Yes

## 2019-09-02 NOTE — PROGRESS NOTES
[facility]    Medicine Progress Note - Hospitalist Service       Date of Admission:  9/1/2019  Assessment & Plan   Assessment & Plan     Bita Veronica is a 85 year old female with advanced dementia, Parkinson's, essentially wheelchair bound, frequent falls (forgets she is unable to ambulate), sss with pacemaker, HLP, hypothyroidism, depression, syncope admitted on 9/1/2019 with a fall. Was hypoxic in the ED.     Hypoxia     - unclear cause    - patient can give no meaningful history    - d-dimer was slightly elevated, though likely normal for her age    - CT PE study already ordered from ED: cannot be done due to allergy to contrast    - VQ scan was ordered: I spoke at length with her  about the possibility of PE, the testing for it and the treatment. He does not want the VQ scan done as he would not want her on blood thinners due to her fall risk. I will discontinue the VQ scan.    - will try to titrate O2 off    - CXR normal from ED     Falls    - patient falls frequently    - she got out of bed without her 's knowledge and forgot she cannot walk    - has facial trauma    - CT imaging in ED was negative for fractures    - no further work-up    - working with assisted living on increasing care (see below)     Advanced dementia with Parkinsons    - no need for PT as she is essentially wheelchair bound     - cont sinemet      Hypothyroidism    - cont synthroid     HLP    - cont statin     Depression    - on celexa    Plan for discharge    - I spoke with Adal at [facility] who prefers patient discharge tomorrow when he has a full staff    - social work spent a lot of time with patient,  and daughter today (had to ask daughter to leave because she was yelling/arguing with her father).  insists that his wife has been a fall risk for 15 years an wants to take her home. I did convince him to wait until tomorrow so we can follow-up on her O2 sats and her facility will have a  full staff tomorrow as well.     Met with  twice for 15-20 mins     Diet: Regular Diet Adult    DVT Prophylaxis: Enoxaparin (Lovenox) SQ  Ross Catheter: not present  Code Status: Full Code      Disposition Plan   Expected discharge: Tomorrow, recommended to prior living arrangement once safe disposition plan/ TCU bed available.  Entered: Carlos Boyd MD 09/02/2019, 11:58 AM       The patient's care was discussed with the Bedside Nurse, Patient, Patient's Family and Care Facility.    Carlos Boyd MD  Hospitalist Service  [facility]    ______________________________________________________________________    Interval History   Patient in bed. I helped her get to a chair. She denies pain. She ate a full breakfast. Confused at her baseline.      Data reviewed today: I reviewed all medications, new labs and imaging results over the last 24 hours. I personally reviewed no images or EKG's today.    Physical Exam   Vital Signs: Temp: 97.5  F (36.4  C) Temp src: Axillary BP: 111/54 Pulse: 80 Heart Rate: 60 Resp: 14 SpO2: 95 % O2 Device: None (Room air) Oxygen Delivery: 1 LPM  Weight: 116 lbs 0 oz  Constitutional: awake, alert, cooperative, no apparent distress, and appears stated age  Eyes: Lids and lashes normal, pupils equal, round and reactive to light, extra ocular muscles intact, sclera clear, conjunctiva normal  ENT: facial trauma noted  Respiratory: No increased work of breathing, good air exchange, clear to auscultation bilaterally, no crackles or wheezing  Cardiovascular: Normal apical impulse, regular rate and rhythm, normal S1 and S2, no S3 or S4, and no murmur noted  GI: No scars, normal bowel sounds, soft, non-distended, non-tender, no masses palpated, no hepatosplenomegally  Musculoskeletal: no lower extremity pitting edema present  Does not know place or date    Data   Recent Labs   Lab 09/02/19  0630 09/01/19  0555   WBC  --  9.0   HGB  --  15.5   MCV  --  102*   PLT  --  262     141   POTASSIUM 3.9 4.4   CHLORIDE 106 104   CO2 31 34*   BUN 12 16   CR 0.63 0.81   ANIONGAP 4 3   DENYS 8.8 9.3   GLC 76 79   TROPI  --  <0.015

## 2019-09-03 VITALS
SYSTOLIC BLOOD PRESSURE: 123 MMHG | HEART RATE: 80 BPM | WEIGHT: 116 LBS | OXYGEN SATURATION: 90 % | TEMPERATURE: 97.6 F | DIASTOLIC BLOOD PRESSURE: 65 MMHG | RESPIRATION RATE: 16 BRPM

## 2019-09-03 LAB
INTERPRETATION ECG - MUSE: NORMAL
INTERPRETATION ECG - MUSE: NORMAL

## 2019-09-03 PROCEDURE — 99217 ZZC OBSERVATION CARE DISCHARGE: CPT | Performed by: HOSPITALIST

## 2019-09-03 PROCEDURE — 25000132 ZZH RX MED GY IP 250 OP 250 PS 637: Mod: GY | Performed by: PHYSICIAN ASSISTANT

## 2019-09-03 PROCEDURE — G0378 HOSPITAL OBSERVATION PER HR: HCPCS

## 2019-09-03 RX ADMIN — CARBIDOPA AND LEVODOPA 3 HALF-TAB: 25; 100 TABLET ORAL at 09:04

## 2019-09-03 RX ADMIN — CARBIDOPA AND LEVODOPA 3 HALF-TAB: 25; 100 TABLET ORAL at 11:37

## 2019-09-03 RX ADMIN — BUPROPION HYDROCHLORIDE 300 MG: 150 TABLET, FILM COATED, EXTENDED RELEASE ORAL at 09:05

## 2019-09-03 RX ADMIN — BUSPIRONE HYDROCHLORIDE 5 MG: 5 TABLET ORAL at 10:48

## 2019-09-03 RX ADMIN — DOCUSATE SODIUM 100 MG: 100 CAPSULE, LIQUID FILLED ORAL at 09:05

## 2019-09-03 RX ADMIN — RIVASTIGMINE TARTRATE 3 MG: 1.5 CAPSULE ORAL at 09:04

## 2019-09-03 NOTE — PLAN OF CARE
PRIMARY DIAGNOSIS: GENERALIZED WEAKNESS    OUTPATIENT/OBSERVATION GOALS TO BE MET BEFORE DISCHARGE  1. Orthostatic performed: No    2. Tolerating PO medications: Yes    3. Return to near baseline physical activity: Yes    4. Cleared for discharge by consultants (if involved): No    Discharge Planner Nurse   Safe discharge environment identified: No  Barriers to discharge: Yes       Entered by: Stephen Barroso 09/03/2019 5:39 AM     Please review provider order for any additional goals.   Nurse to notify provider when observation goals have been met and patient is ready for discharge.  Temp: 97.5  F (36.4  C) Temp src: Oral BP: (!) 172/74 Pulse: 80 Heart Rate: 62 Resp: 16 SpO2: 97 % O2 Device: Nasal cannula Oxygen Delivery: 2 LPM  Pt alert to self only. Pt assist of 2 to pivot to wheelchair. Pt denies pain. Pt has bruising over entire body w/ a laceration on her head. No PIV. Pt on O2 @ 2 LPM, attempted to wean to 1.5 LPM without success. Plan: SW consulted to increase cares at [facility]

## 2019-09-03 NOTE — PLAN OF CARE
Pt is currently sleeping and RN was advised not to disturb her, will continue to monitor and provide supportive cares

## 2019-09-03 NOTE — PLAN OF CARE
Patient Improving    PRIMARY DIAGNOSIS: GENERALIZED WEAKNESS    OUTPATIENT/OBSERVATION GOALS TO BE MET BEFORE DISCHARGE  1. Orthostatic performed: No    2. Tolerating PO medications: Yes    3. Return to near baseline physical activity: Yes    4. Cleared for discharge by consultants (if involved): No    Vitals are Temp: 97.3  F (36.3  C) Temp src: Axillary BP: (!) 144/73 Pulse: 80 Heart Rate: 59 Resp: 18 SpO2: 93 %.  Patient is Alert to and Self. They are 2 assist with Wheelchair.  Pt is a Regular diet.  They are denying pain.  Patient has no IV access.  Patient head wound has steri strips and is CDI.  Patient is on 2L of O2, unable to wean down.  Plan of care is  consult for increasing cares at [facility].      Discharge Planner Nurse   Safe discharge environment identified: No  Barriers to discharge: Yes            Please review provider order for any additional goals.   Nurse to notify provider when observation goals have been met and patient is ready for discharge.

## 2019-09-03 NOTE — PROGRESS NOTES
Discharge Planner   Discharge Plans in progress: Return to [facility], [phone]. (F) [phone].  Barriers to discharge plan: None anticipated.  Follow up plan: Spoke with RN at  facility to update them on pt's return to their facility today. Family coordinating increasing services with facility. Orders to be faxed.  to provide transport.       Entered by: Padma Hwang 09/03/2019 11:12 AM       1125: Discharge orders faxed to facility.

## 2019-09-03 NOTE — PLAN OF CARE
"Patient's  stated \"I do not want her cares increased unless that women () is going to pay for it.  Because it is 40$ a day and its their opinion that she needs that.\"  "

## 2019-09-03 NOTE — PLAN OF CARE
Patient's After Visit Summary was reviewed with patient and/or spouse.   Patient verbalized understanding of After Visit Summary, recommended follow up and was given an opportunity to ask questions.   Discharge medications sent home with patient/family: Not applicable   Discharged with spouse      OBSERVATION patient END time: 11:49 AM

## 2019-09-03 NOTE — PLAN OF CARE
PRIMARY DIAGNOSIS: GENERALIZED WEAKNESS    OUTPATIENT/OBSERVATION GOALS TO BE MET BEFORE DISCHARGE  1. Orthostatic performed: No    2. Tolerating PO medications: Yes    3. Return to near baseline physical activity: No    4. Cleared for discharge by consultants (if involved): No    Discharge Planner Nurse   Safe discharge environment identified: Yes  Barriers to discharge: Yes       Entered by: Amina Gutierrez 09/03/2019 9:31 AM   Pt alert to self. Up Assist of 2 pivot transfer. Purewick in place. Tolerating pills in applesauce. Took a lot of encouragement. Trying to wean off oxygen. Laceration with steri strips in place. Regular diet. Plan: Monitor oxygen. SW following. Hopeful to discharge today.   Please review provider order for any additional goals.   Nurse to notify provider when observation goals have been met and patient is ready for discharge.

## 2019-09-03 NOTE — DISCHARGE SUMMARY
[facility]  Hospitalist Discharge Summary       Date of Admission:  9/1/2019  Date of Discharge:  9/3/2019  Discharging Provider: Carlos Boyd MD      Discharge Diagnoses   Fall with facial trauma    Follow-ups Needed After Discharge   Follow-up Appointments     Follow-up and recommended labs and tests       Follow up with primary care provider, [facility],   within 7 days for hospital follow- up.  No follow up labs or test are   needed.             Unresulted Labs Ordered in the Past 30 Days of this Admission     No orders found from 8/2/2019 to 9/2/2019.      These results will be followed up by NA    Discharge Disposition   Discharged to assisted living  Condition at discharge: [facility]   Bita Veronica is a 85 year old female with advanced dementia, Parkinson's, essentially wheelchair bound, frequent falls (forgets she is unable to ambulate), sss with pacemaker, HLP, hypothyroidism, depression, syncope admitted on 9/1/2019 with a fall. Was hypoxic in the ED.     Hypoxia     - unclear cause    - patient can give no meaningful history    - d-dimer was slightly elevated, though likely normal for her age    - CT PE study already ordered from ED: cannot be done due to allergy to contrast    - VQ scan was ordered: I spoke at length with her  about the possibility of PE, the testing for it and the treatment. He does not want the VQ scan done as he would not want her on blood thinners due to her fall risk. I will discontinue the VQ scan.     - CXR normal from ED    - now titrated off oxygen, maintaining sats at around 90%: no further work-up     Falls    - patient falls frequently    - she got out of bed without her 's knowledge and forgot she cannot walk    - has facial trauma    - CT imaging in ED was negative for fractures    - no further work-up    - working with assisted living on increasing care (see below)     Advanced dementia with  "Parkinsons    - no need for PT as she is essentially wheelchair bound     - cont sinemet      Hypothyroidism    - cont synthroid     HLP    - cont statin     Depression    - on celexa     Plan for discharge    - I spoke with Adal at [facility] yesterday and again today. He is working with Mr. Veronica on increasing care. I did speak to Adal about my concerns regarding Mr. Veronica's memory and that he is likely not always making the best decisions for him and his wife. Adal is aware. I also spoke to the daughter (Lisette). She is also aware of her father's memory issues and has been trying to help (but he can be very stubborn). Patient will discharge today.    I spent 30mins in the patient's room today. Patient was in chair.  in room. Patient denied any pain or symptoms. Wants to go home.  did not remember who I was or that I had spent 30+ mins yesterday and the day prior talking to him. Again today he told me about his professional history (again). He also was told me the circumstances surrounding his wife's fall (again), and (again) fixed on the fact that the facility needs to use a life to help people off the floor. He does tell me that he spoke with Adal at [facility] this morning. When I asked if I could call his daughter, he initially said \"no\", but then said I could if I wanted but to tell her \"the plan is what I say it is\".    Consultations This Hospital Stay   SOCIAL WORK IP CONSULT    Code Status   Full Code    Time Spent on this Encounter   I, Carlos Boyd MD, personally saw the patient today and spent greater than 30 minutes discharging this patient.       Carlos Boyd MD  [facility]  ______________________________________________________________________    Physical Exam   Vital Signs: Temp: 97.6  F (36.4  C) Temp src: Oral BP: (!) 151/73 Pulse: 80 Heart Rate: 64 Resp: 16 SpO2: 90 % O2 Device: None (Room air) Oxygen Delivery: 1 LPM  Weight: 116 lbs 0 " oz  Constitutional: awake, alert, cooperative, no apparent distress, and appears stated age  Eyes: Lids and lashes normal, pupils equal, round and reactive to light, extra ocular muscles intact, sclera clear, conjunctiva normal  ENT: facial trauma noted, stable  Respiratory: No increased work of breathing, good air exchange, clear to auscultation bilaterally, no crackles or wheezing  Cardiovascular: Normal apical impulse, regular rate and rhythm, normal S1 and S2, no S3 or S4, and no murmur noted  GI: No scars, normal bowel sounds, soft, non-distended, non-tender, no masses palpated, no hepatosplenomegally  Skin: no redness, warmth, or swelling  Musculoskeletal: no lower extremity pitting edema present       Primary Care Physician   [facility]    Discharge Orders      Reason for your hospital stay    fall     Follow-up and recommended labs and tests     Follow up with primary care provider, [facility], within 7 days for hospital follow- up.  No follow up labs or test are needed.     Activity    Your activity upon discharge: activity as tolerated     Diet    Follow this diet upon discharge: Regular       Significant Results and Procedures   Most Recent 3 CBC's:  Recent Labs   Lab Test 09/01/19  0555   WBC 9.0   HGB 15.5   *        Most Recent 3 BMP's:  Recent Labs   Lab Test 09/02/19  0630 09/01/19  0555    141   POTASSIUM 3.9 4.4   CHLORIDE 106 104   CO2 31 34*   BUN 12 16   CR 0.63 0.81   ANIONGAP 4 3   DENYS 8.8 9.3   GLC 76 79     Most Recent 2 LFT's:No lab results found.  Most Recent Urinalysis:  Recent Labs   Lab Test 09/01/19  0858   COLOR Light Yellow   APPEARANCE Clear   URINEGLC Negative   URINEBILI Negative   URINEKETONE Negative   SG 1.011   UBLD Trace*   URINEPH 6.5   PROTEIN Negative   NITRITE Negative   LEUKEST Negative   RBCU 9*   WBCU 2   ,   Results for orders placed or performed during the hospital encounter of 09/01/19   CT Head w/o  Contrast    Narrative    CT SCAN OF THE HEAD WITHOUT CONTRAST   9/1/2019 7:12 AM     HISTORY: Head trauma, minor, patient on anticoagulation.    TECHNIQUE:  Axial images of the head and coronal reformations without  IV contrast material. Radiation dose for this scan was reduced using  automated exposure control, adjustment of the mA and/or kV according  to patient size, or iterative reconstruction technique.    COMPARISON: None.    FINDINGS: There is no evidence of intracranial hemorrhage, mass, acute  infarct or anomaly. There is generalized atrophy of the brain. There  is low attenuation in the white matter of the cerebral hemispheres  consistent with sequelae of small vessel ischemic disease. Ventricular  size is within normal limits without evidence of hydrocephalus.     Right frontal scalp soft tissue injury. Please see dedicated facial  bone CT for details of the facial bones.      Impression    IMPRESSION:     1. No evidence of acute intracranial hemorrhage, mass, or herniation.  2. There is generalized atrophy of the brain. White matter changes are  present in the cerebral hemispheres that are consistent with small  vessel ischemic disease in this age patient.    ANDREW MEYER MD   CT Facial Bones without Contrast    Narrative    CT SCAN OF THE FACE WITHOUT CONTRAST 9/1/2019 7:12 AM     HISTORY: Facial trauma, fracture suspected.     TECHNIQUE: Axial CT images of the facial bones were completed with  sagittal and coronal reformations. Radiation dose for this scan was  reduced using automated exposure control, adjustment of the mA and/or  kV according to patient size, or iterative reconstruction technique.     COMPARISON: None.     FINDINGS:  Right frontal scalp soft tissue injury and hematoma. No  underlying fractures appreciated. No facial bone fracture is seen.     Degenerative changes of the temporomandibular joints, right greater  than left.     Scattered mild polypoid mucosal thickening in the ethmoid  air cells.     No mass identified within the visualized soft tissues of the neck. The  visualized intracranial structures are unremarkable.       Impression    IMPRESSION: Right frontal scalp soft tissue swelling and hematoma. No  evidence of facial bone fracture.     ANDREW MEYER MD   Cervical spine CT w/o contrast    Narrative    CT CERVICAL SPINE WITHOUT CONTRAST   9/1/2019 7:11 AM     HISTORY:  Fall, midline pain.     TECHNIQUE: Axial images of the cervical spine were obtained without  intravenous contrast. Multiplanar reformations were performed.   Radiation dose for this scan was reduced using automated exposure  control, adjustment of the mA and/or kV according to patient size, or  iterative reconstruction technique.    COMPARISON: None.    FINDINGS: No evidence of fracture. No prevertebral soft tissue  swelling. Alignment is normal. Vertebral body height is maintained. No  destructive osseous lesions. Moderate degenerative endplate changes  and loss of intervertebral disc space at C5-6 and C6-7. No significant  spinal canal narrowing at any level. Varying degrees of neural  foraminal narrowing due to facet hypertrophy and uncinate spurring.     Visualized paraspinous tissues: Unremarkable.      Impression    IMPRESSION:   1. No evidence of acute fracture or subluxation in the cervical spine.  2. Degenerative changes in the cervical spine as described above.    ANDREW MEYER MD   XR Chest 2 Views    Narrative    CHEST TWO VIEWS   9/1/2019 9:52 AM     HISTORY: Fall.    COMPARISON: None available    FINDINGS: AP and lateral views of the chest were obtained. Left chest  wall AICD and leads are noted. Mild enlargement of cardiac silhouette.  No focal pulmonary opacities. No significant pleural effusion or  pneumothorax. The visualized upper abdomen is unremarkable.      Impression    IMPRESSION:  1. No acute airspace disease.  2. Mild enlargement of the cardiac silhouette.    ALPHONSE MCKEON MD        Discharge Medications   Current Discharge Medication List      CONTINUE these medications which have NOT CHANGED    Details   acetaminophen (TYLENOL) 500 MG tablet Take 1,000 mg by mouth 3 times daily as needed       aspirin 81 MG EC tablet Take 81 mg by mouth daily       buPROPion (WELLBUTRIN XL) 300 MG 24 hr tablet Take 300 mg by mouth every morning      busPIRone (BUSPAR) 5 MG tablet Take 5 mg by mouth 2 times daily 1030am + 430pm      carbidopa-levodopa (SINEMET CR)  MG CR tablet Take 2 tablets by mouth At Bedtime      carbidopa-levodopa (SINEMET)  MG tablet Take 1.5 tablets by mouth 5 times daily 730am - 1030am - 130pm - 430pm - 730pm       cholecalciferol 1000 units TABS Take 1 tablet by mouth daily      docusate sodium (COLACE) 100 MG capsule Take 100 mg by mouth daily      escitalopram (LEXAPRO) 20 MG tablet Take 20 mg by mouth every evening      fluocinolone acetonide 0.01 % OIL Apply topically daily as needed To scalp twice weekly as needed      folic acid 0.8 MG CAPS Take 1 capsule by mouth daily       ibuprofen (ADVIL/MOTRIN) 600 MG tablet Take 600 mg by mouth every 6 hours as needed for moderate pain      ketoconazole (NIZORAL) 2 % external shampoo Apply topically twice a week Tuesday + Friday evenings      levothyroxine (SYNTHROID/LEVOTHROID) 75 MCG tablet Take 75 mcg by mouth every evening       omeprazole (PRILOSEC) 20 MG DR capsule Take 20 mg by mouth daily       QUEtiapine (SEROQUEL) 25 MG tablet Take 12.5 mg by mouth At Bedtime      rivastigmine (EXELON) 1.5 MG capsule Take 3 mg by mouth 2 times daily      vitamin B complex with vitamin C (VITAMIN  B COMPLEX) tablet Take 1 tablet by mouth daily           Allergies   Allergies   Allergen Reactions     Shellfish Allergy Anaphylaxis     Iodine      Shellfish-Derived Products

## 2020-01-01 ENCOUNTER — RECORDS - HEALTHEAST (OUTPATIENT)
Dept: LAB | Facility: CLINIC | Age: 85
End: 2020-01-01

## 2020-01-01 LAB
ALBUMIN UR-MCNC: ABNORMAL MG/DL
ALBUMIN UR-MCNC: ABNORMAL MG/DL
APPEARANCE UR: ABNORMAL
APPEARANCE UR: ABNORMAL
BACTERIA #/AREA URNS HPF: ABNORMAL HPF
BACTERIA #/AREA URNS HPF: ABNORMAL HPF
BACTERIA SPEC CULT: ABNORMAL
BACTERIA SPEC CULT: NO GROWTH
BILIRUB UR QL STRIP: NEGATIVE
BILIRUB UR QL STRIP: NEGATIVE
CAOX CRY #/AREA URNS HPF: PRESENT /[HPF]
COLOR UR AUTO: YELLOW
COLOR UR AUTO: YELLOW
GLUCOSE UR STRIP-MCNC: NEGATIVE MG/DL
GLUCOSE UR STRIP-MCNC: NEGATIVE MG/DL
HGB UR QL STRIP: ABNORMAL
HGB UR QL STRIP: ABNORMAL
KETONES UR STRIP-MCNC: ABNORMAL MG/DL
KETONES UR STRIP-MCNC: ABNORMAL MG/DL
LEUKOCYTE ESTERASE UR QL STRIP: ABNORMAL
LEUKOCYTE ESTERASE UR QL STRIP: ABNORMAL
MUCOUS THREADS #/AREA URNS LPF: ABNORMAL LPF
MUCOUS THREADS #/AREA URNS LPF: ABNORMAL LPF
NITRATE UR QL: NEGATIVE
NITRATE UR QL: POSITIVE
PH UR STRIP: 5 [PH] (ref 4.5–8)
PH UR STRIP: 5.5 [PH] (ref 4.5–8)
RBC #/AREA URNS AUTO: ABNORMAL HPF
RBC #/AREA URNS AUTO: ABNORMAL HPF
SP GR UR STRIP: 1.01 (ref 1–1.03)
SP GR UR STRIP: 1.02 (ref 1–1.03)
SQUAMOUS #/AREA URNS AUTO: >100 LPF
SQUAMOUS #/AREA URNS AUTO: ABNORMAL LPF
UROBILINOGEN UR STRIP-ACNC: ABNORMAL
UROBILINOGEN UR STRIP-ACNC: ABNORMAL
WBC #/AREA URNS AUTO: ABNORMAL HPF
WBC #/AREA URNS AUTO: ABNORMAL HPF
WBC CLUMPS #/AREA URNS HPF: PRESENT /[HPF]
WBC CLUMPS #/AREA URNS HPF: PRESENT /[HPF]

## 2022-06-04 ENCOUNTER — TELEPHONE ENCOUNTER (OUTPATIENT)
Dept: URBAN - METROPOLITAN AREA CLINIC 68 | Facility: CLINIC | Age: 87
End: 2022-06-04

## 2022-06-05 ENCOUNTER — TELEPHONE ENCOUNTER (OUTPATIENT)
Dept: URBAN - METROPOLITAN AREA CLINIC 68 | Facility: CLINIC | Age: 87
End: 2022-06-05

## 2022-06-25 ENCOUNTER — TELEPHONE ENCOUNTER (OUTPATIENT)
Age: 87
End: 2022-06-25

## 2022-06-26 ENCOUNTER — TELEPHONE ENCOUNTER (OUTPATIENT)
Age: 87
End: 2022-06-26